# Patient Record
Sex: MALE
[De-identification: names, ages, dates, MRNs, and addresses within clinical notes are randomized per-mention and may not be internally consistent; named-entity substitution may affect disease eponyms.]

---

## 2018-03-30 ENCOUNTER — HOSPITAL ENCOUNTER (EMERGENCY)
Dept: HOSPITAL 14 - H.ER | Age: 68
Discharge: HOME | End: 2018-03-30
Payer: COMMERCIAL

## 2018-03-30 VITALS — HEART RATE: 64 BPM | DIASTOLIC BLOOD PRESSURE: 81 MMHG | TEMPERATURE: 99 F | SYSTOLIC BLOOD PRESSURE: 145 MMHG

## 2018-03-30 VITALS — RESPIRATION RATE: 16 BRPM

## 2018-03-30 DIAGNOSIS — Z90.49: ICD-10-CM

## 2018-03-30 DIAGNOSIS — Z83.3: ICD-10-CM

## 2018-03-30 DIAGNOSIS — S92.901A: Primary | ICD-10-CM
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
## 2018-03-30 NOTE — RAD
PROCEDURE:  Right Ankle Radiographs. 



HISTORY:

trauma  



COMPARISON:

None



FINDINGS:



BONES:

Nondisplaced intra-articular base of 5th metatarsal fracture. 



JOINTS:

Ankle mortise maintained. Talar dome intact



SOFT TISSUES:

Marked lateral malleolar soft tissue swelling. 



OTHER FINDINGS:

Ankle joint effusion. Inferior plantar calcaneal spur.



IMPRESSION:

Nondisplaced intra-articular base of 5th metatarsal fracture.

## 2018-03-30 NOTE — RAD
PROCEDURE:  Right Foot Radiographs.



HISTORY:

trauma  



COMPARISON:

None.



FINDINGS:



BONES:

Nondisplaced intra-articular base of 5th metatarsal fracture. 



JOINTS:

Unremarkable. 



SOFT TISSUES:

Marked lateral malleolar soft tissue swelling. 



OTHER FINDINGS:

Ankle joint effusion.  Inferior plantar calcaneal spur.



IMPRESSION:

Nondisplaced intra-articular base of 5th metatarsal fracture.

## 2018-03-30 NOTE — RAD
PROCEDURE:  Radiographs of the Right Shoulder



HISTORY:

trauma







COMPARISON:

No prior.



FINDINGS:



BONES:

No acute fracture.



JOINTS:

Glenohumeral and acromioclavicular joint degenerative changes. 



SOFT TISSUES:

Normal.



OTHER FINDINGS:

None.



IMPRESSION:

No demonstrated fracture or dislocation.
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
## 2018-03-31 VITALS — OXYGEN SATURATION: 100 %
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
## 2018-04-03 ENCOUNTER — HOSPITAL ENCOUNTER (EMERGENCY)
Dept: HOSPITAL 14 - H.ER | Age: 68
Discharge: HOME | End: 2018-04-03
Payer: COMMERCIAL

## 2018-04-03 VITALS
SYSTOLIC BLOOD PRESSURE: 157 MMHG | DIASTOLIC BLOOD PRESSURE: 80 MMHG | OXYGEN SATURATION: 97 % | HEART RATE: 74 BPM | TEMPERATURE: 98.6 F | RESPIRATION RATE: 20 BRPM

## 2018-04-03 DIAGNOSIS — Z47.89: Primary | ICD-10-CM

## 2018-04-03 NOTE — ED PDOC
Lower Extremity Pain/Injury


Time Seen by Provider: 04/03/18 08:59


Chief Complaint (Nursing): Lower Extremity Problem/Injury


History Per: Patient


History/Exam Limitations: no limitations


Onset/Duration Of Symptoms: Sudden Onset (4 days ago)


Current Symptoms Are (Timing): Still Present


Severity: Mild


Additional History Per: Patient


Additional Complaint(s): 


pt seen here on 3/30/2018 and diagnosed with a left nondisplaced intraarticular 

5th mtp fracture seen by podiatry and placed in a splint, pt unable to f/u on 

clinic yesterday pt come here states mild foot pain no other sx no foot n/t/w, 

no leg swelling, no cp or sob.





Past Medical History


Reviewed: Historical Data, Nursing Documentation, Vital Signs


Vital Signs: 





 Last Vital Signs











Temp  98.6 F   04/03/18 08:50


 


Pulse  74   04/03/18 08:50


 


Resp  20   04/03/18 08:50


 


BP  157/80 H  04/03/18 08:50


 


Pulse Ox  97   04/03/18 08:50














- Family History


Family History: States: Unknown Family Hx





- Living Arrangements


Living Arrangements: With Family





- Allergies


Allergies/Adverse Reactions: 


 Allergies











Allergy/AdvReac Type Severity Reaction Status Date / Time


 


No Known Allergies Allergy   Verified 04/03/18 08:49














Review of Systems


ROS Statement: Except As Marked, All Systems Reviewed And Found Negative


Constitutional: Negative for: Fever, Chills


Cardiovascular: Negative for: Chest Pain


Respiratory: Negative for: Shortness of Breath


Gastrointestinal: Negative for: Nausea, Vomiting, Abdominal Pain


Musculoskeletal: Positive for: Foot Pain (left)


Neurological: Negative for: Weakness, Numbness





Physical Exam





- Reviewed


Nursing Documentation Reviewed: Yes


Vital Signs Reviewed: Yes





- Physical Exam


Head Exam: Positive for: ATRAUMATIC, NORMAL INSPECTION, NORMOCEPHALIC


Eye Exam: Positive for: Normal appearance, EOMI


Neck: Positive for: Normal, Painless ROM, Supple


Cardiovascular/Chest: Positive for: Regular Rate, Rhythm, Chest Non Tender


Respiratory: Positive for: Normal Breath Sounds


Extremity: Positive for: Normal ROM, Other (pt foot in short leg splint, nml 

cap refill, moves toes wells, no leg swelling).  Negative for: Tenderness


Neurologic/Psych: Positive for: Alert, CNs II-XII, Oriented.  Negative for: 

Motor/Sensory Deficits





- ECG


O2 Sat by Pulse Oximetry: 97


Pulse Ox Interpretation: Normal





Medical Decision Making


Medical Decision Making: 





contacted podiatry will f/u in clinic monday april 9 2018. all of pt's 

questions were answered and pt agree's with plan. tylenol prn pain





Disposition





- Clinical Impression


Clinical Impression: 


 Foot fracture








- Patient ED Disposition


Is Patient to be Admitted: No





- Disposition


Referrals: 


Podiatry Clinic [Outside] (4/9/2018)


Disposition: Routine/Home


Disposition Time: 09:25


Condition: GOOD


Instructions:  Foot Fracture (DC)


Forms:  [employer] (Swedish)


Print Language: Salvadorean